# Patient Record
Sex: FEMALE | Race: BLACK OR AFRICAN AMERICAN | NOT HISPANIC OR LATINO | URBAN - METROPOLITAN AREA
[De-identification: names, ages, dates, MRNs, and addresses within clinical notes are randomized per-mention and may not be internally consistent; named-entity substitution may affect disease eponyms.]

---

## 2017-01-04 ENCOUNTER — INPATIENT (INPATIENT)
Facility: HOSPITAL | Age: 32
LOS: 2 days | Discharge: HOME | DRG: 918 | End: 2017-01-07
Attending: HOSPITALIST | Admitting: HOSPITALIST
Payer: MEDICAID

## 2017-01-04 VITALS
RESPIRATION RATE: 18 BRPM | SYSTOLIC BLOOD PRESSURE: 169 MMHG | HEART RATE: 155 BPM | OXYGEN SATURATION: 100 % | DIASTOLIC BLOOD PRESSURE: 104 MMHG | TEMPERATURE: 98 F

## 2017-01-04 LAB
ALBUMIN SERPL ELPH-MCNC: 4.4 G/DL — SIGNIFICANT CHANGE UP (ref 3.3–5.2)
ALP SERPL-CCNC: 74 U/L — SIGNIFICANT CHANGE UP (ref 40–120)
ALT FLD-CCNC: 20 U/L — SIGNIFICANT CHANGE UP
ANION GAP SERPL CALC-SCNC: 14 MMOL/L — SIGNIFICANT CHANGE UP (ref 5–17)
ANION GAP SERPL CALC-SCNC: 26 MMOL/L — HIGH (ref 5–17)
APAP SERPL-MCNC: <9.3 UG/ML — LOW (ref 10–26)
AST SERPL-CCNC: 20 U/L — SIGNIFICANT CHANGE UP
BASOPHILS # BLD AUTO: 0 K/UL — SIGNIFICANT CHANGE UP (ref 0–0.2)
BASOPHILS NFR BLD AUTO: 0.4 % — SIGNIFICANT CHANGE UP (ref 0–2)
BILIRUB SERPL-MCNC: 0.1 MG/DL — LOW (ref 0.4–2)
BUN SERPL-MCNC: 8 MG/DL — SIGNIFICANT CHANGE UP (ref 8–20)
BUN SERPL-MCNC: 8 MG/DL — SIGNIFICANT CHANGE UP (ref 8–20)
CALCIUM SERPL-MCNC: 9 MG/DL — SIGNIFICANT CHANGE UP (ref 8.6–10.2)
CALCIUM SERPL-MCNC: 9.3 MG/DL — SIGNIFICANT CHANGE UP (ref 8.6–10.2)
CHLORIDE SERPL-SCNC: 101 MMOL/L — SIGNIFICANT CHANGE UP (ref 98–107)
CHLORIDE SERPL-SCNC: 102 MMOL/L — SIGNIFICANT CHANGE UP (ref 98–107)
CO2 SERPL-SCNC: 17 MMOL/L — LOW (ref 22–29)
CO2 SERPL-SCNC: 25 MMOL/L — SIGNIFICANT CHANGE UP (ref 22–29)
CREAT SERPL-MCNC: 0.89 MG/DL — SIGNIFICANT CHANGE UP (ref 0.5–1.3)
CREAT SERPL-MCNC: 0.96 MG/DL — SIGNIFICANT CHANGE UP (ref 0.5–1.3)
EOSINOPHIL # BLD AUTO: 0.1 K/UL — SIGNIFICANT CHANGE UP (ref 0–0.5)
EOSINOPHIL NFR BLD AUTO: 1.3 % — SIGNIFICANT CHANGE UP (ref 0–6)
ETHANOL SERPL-MCNC: 33 MG/DL — SIGNIFICANT CHANGE UP
GLUCOSE SERPL-MCNC: 91 MG/DL — SIGNIFICANT CHANGE UP (ref 70–115)
GLUCOSE SERPL-MCNC: 97 MG/DL — SIGNIFICANT CHANGE UP (ref 70–115)
HCG SERPL-ACNC: <2 MIU/ML — SIGNIFICANT CHANGE UP
HCT VFR BLD CALC: 40 % — SIGNIFICANT CHANGE UP (ref 37–47)
HGB BLD-MCNC: 13.2 G/DL — SIGNIFICANT CHANGE UP (ref 12–16)
LACTATE BLDV-MCNC: 2.8 MMOL/L — HIGH (ref 0.7–2)
LYMPHOCYTES # BLD AUTO: 1.7 K/UL — SIGNIFICANT CHANGE UP (ref 1–4.8)
LYMPHOCYTES # BLD AUTO: 16.6 % — LOW (ref 20–55)
MAGNESIUM SERPL-MCNC: 2.3 MG/DL — SIGNIFICANT CHANGE UP (ref 1.8–2.5)
MCHC RBC-ENTMCNC: 28 PG — SIGNIFICANT CHANGE UP (ref 27–31)
MCHC RBC-ENTMCNC: 33 G/DL — SIGNIFICANT CHANGE UP (ref 32–36)
MCV RBC AUTO: 84.9 FL — SIGNIFICANT CHANGE UP (ref 81–99)
MONOCYTES # BLD AUTO: 0.6 K/UL — SIGNIFICANT CHANGE UP (ref 0–0.8)
MONOCYTES NFR BLD AUTO: 5.9 % — SIGNIFICANT CHANGE UP (ref 3–10)
NEUTROPHILS # BLD AUTO: 7.6 K/UL — SIGNIFICANT CHANGE UP (ref 1.8–8)
NEUTROPHILS NFR BLD AUTO: 75.5 % — HIGH (ref 37–73)
PHOSPHATE SERPL-MCNC: 2.2 MG/DL — LOW (ref 2.4–4.7)
PLATELET # BLD AUTO: 312 K/UL — SIGNIFICANT CHANGE UP (ref 150–400)
POTASSIUM SERPL-MCNC: 3.5 MMOL/L — SIGNIFICANT CHANGE UP (ref 3.5–5.3)
POTASSIUM SERPL-MCNC: 3.5 MMOL/L — SIGNIFICANT CHANGE UP (ref 3.5–5.3)
POTASSIUM SERPL-SCNC: 3.5 MMOL/L — SIGNIFICANT CHANGE UP (ref 3.5–5.3)
POTASSIUM SERPL-SCNC: 3.5 MMOL/L — SIGNIFICANT CHANGE UP (ref 3.5–5.3)
PROT SERPL-MCNC: 8.1 G/DL — SIGNIFICANT CHANGE UP (ref 6.6–8.7)
RBC # BLD: 4.71 M/UL — SIGNIFICANT CHANGE UP (ref 4.4–5.2)
RBC # FLD: 14.7 % — SIGNIFICANT CHANGE UP (ref 11–15.6)
SALICYLATES SERPL-MCNC: <2 MG/DL — LOW (ref 10–20)
SODIUM SERPL-SCNC: 140 MMOL/L — SIGNIFICANT CHANGE UP (ref 135–145)
SODIUM SERPL-SCNC: 145 MMOL/L — SIGNIFICANT CHANGE UP (ref 135–145)
WBC # BLD: 10.07 K/UL — SIGNIFICANT CHANGE UP (ref 4.8–10.8)
WBC # FLD AUTO: 10.07 K/UL — SIGNIFICANT CHANGE UP (ref 4.8–10.8)

## 2017-01-04 PROCEDURE — 93010 ELECTROCARDIOGRAM REPORT: CPT

## 2017-01-04 PROCEDURE — 99291 CRITICAL CARE FIRST HOUR: CPT

## 2017-01-04 RX ORDER — SODIUM CHLORIDE 9 MG/ML
1000 INJECTION INTRAMUSCULAR; INTRAVENOUS; SUBCUTANEOUS ONCE
Qty: 0 | Refills: 0 | Status: COMPLETED | OUTPATIENT
Start: 2017-01-04 | End: 2017-01-04

## 2017-01-04 RX ADMIN — SODIUM CHLORIDE 3000 MILLILITER(S): 9 INJECTION INTRAMUSCULAR; INTRAVENOUS; SUBCUTANEOUS at 21:10

## 2017-01-04 RX ADMIN — Medication 4 MILLIGRAM(S): at 23:23

## 2017-01-04 RX ADMIN — Medication 2 MILLIGRAM(S): at 21:10

## 2017-01-04 NOTE — ED PROVIDER NOTE - MEDICAL DECISION MAKING DETAILS
spoke with poison control agree with benzo support for agitation multiple bedside reassessment to control agitation and multiple benzo doses. per poison control evaluate for rhabdo, benzos and need for admisision. pt and family agree to plan of care

## 2017-01-04 NOTE — ED ADULT NURSE REASSESSMENT NOTE - NS ED NURSE REASSESS COMMENT FT1
Patient resting in bed, family at bedside, patient appears to be slightly anxious, patient asked again if she has suicidal ideation, she denies as per family at bedside patient had a seizure after taking medication. CM in place, awaiting bed will continue to monitor patient Patient resting in bed, family at bedside, patient appears to be slightly anxious, patient asked again if she has suicidal ideation, she denies as per family at bedside patient had a seizure after taking medication. 1:1 at bedside, CM in place, awaiting bed will continue to monitor patient

## 2017-01-04 NOTE — ED PROVIDER NOTE - OBJECTIVE STATEMENT
pt presents with agitations and states she " took 40 unisom pills to kill myself" mother witnessed a seizure she denies any other drug use + h/o bipolar. denies fever. denies HA or neck pain. no chest pain or sob. no abd pain. no n/v/d. no urinary f/u/d. no back pain. no motor or sensory deficits.

## 2017-01-04 NOTE — ED PROVIDER NOTE - CARE PLAN
Principal Discharge DX:	Anticholinergic drug overdose Principal Discharge DX:	Anticholinergic drug overdose  Secondary Diagnosis:	Tachycardia  Secondary Diagnosis:	Anxiety

## 2017-01-04 NOTE — ED ADULT TRIAGE NOTE - CHIEF COMPLAINT QUOTE
as per ems, 76 unasom missing from bottle and took half a pint of vodka. a and o x3. breathing even and unlabored pt has no complaints at this time. reports SI. dr. blankenship called to bedside.

## 2017-01-04 NOTE — ED PROVIDER NOTE - CRITICAL CARE PROVIDED
documentation/interpretation of diagnostic studies/additional history taking/direct patient care (not related to procedure)/consult w/ pt's family directly relating to pts condition/consultation with other physicians

## 2017-01-04 NOTE — ED ADULT NURSE NOTE - OBJECTIVE STATEMENT
Patient states that she took almost 2 full bottles of unisom earlier today. She states that she took it because she wanted to sleep. She denies any si/hi. Patient is a little anxious at this time, she is a&ox3. Denies any complaints of pain or discomfort at this time. Patient respirations even and unlabored, received from ambulance with 18G IV intact LAC. Clothing removed and given to patients aunt at bedside.

## 2017-01-05 DIAGNOSIS — T44.3X1A POISONING BY OTHER PARASYMPATHOLYTICS [ANTICHOLINERGICS AND ANTIMUSCARINICS] AND SPASMOLYTICS, ACCIDENTAL (UNINTENTIONAL), INITIAL ENCOUNTER: ICD-10-CM

## 2017-01-05 LAB
AMPHET UR-MCNC: NEGATIVE — SIGNIFICANT CHANGE UP
APPEARANCE UR: CLEAR — SIGNIFICANT CHANGE UP
BACTERIA # UR AUTO: ABNORMAL
BARBITURATES UR SCN-MCNC: NEGATIVE — SIGNIFICANT CHANGE UP
BENZODIAZ UR-MCNC: NEGATIVE — SIGNIFICANT CHANGE UP
BILIRUB UR-MCNC: NEGATIVE — SIGNIFICANT CHANGE UP
CK MB CFR SERPL CALC: 2.6 NG/ML — SIGNIFICANT CHANGE UP (ref 0–6.7)
CK MB CFR SERPL CALC: 3.3 NG/ML — SIGNIFICANT CHANGE UP (ref 0–6.7)
CK SERPL-CCNC: 2972 U/L — HIGH (ref 25–170)
CK SERPL-CCNC: 3370 U/L — HIGH (ref 25–170)
COCAINE METAB.OTHER UR-MCNC: NEGATIVE — SIGNIFICANT CHANGE UP
COLOR SPEC: YELLOW — SIGNIFICANT CHANGE UP
DIFF PNL FLD: ABNORMAL
EPI CELLS # UR: SIGNIFICANT CHANGE UP
GLUCOSE UR QL: NEGATIVE MG/DL — SIGNIFICANT CHANGE UP
KETONES UR-MCNC: NEGATIVE — SIGNIFICANT CHANGE UP
LEUKOCYTE ESTERASE UR-ACNC: NEGATIVE — SIGNIFICANT CHANGE UP
METHADONE UR-MCNC: NEGATIVE — SIGNIFICANT CHANGE UP
NITRITE UR-MCNC: NEGATIVE — SIGNIFICANT CHANGE UP
OPIATES UR-MCNC: NEGATIVE — SIGNIFICANT CHANGE UP
PCP SPEC-MCNC: SIGNIFICANT CHANGE UP
PCP UR-MCNC: NEGATIVE — SIGNIFICANT CHANGE UP
PH UR: 8 — SIGNIFICANT CHANGE UP (ref 4.8–8)
PROT UR-MCNC: NEGATIVE MG/DL — SIGNIFICANT CHANGE UP
RBC CASTS # UR COMP ASSIST: ABNORMAL /HPF (ref 0–4)
SP GR SPEC: 1.01 — SIGNIFICANT CHANGE UP (ref 1.01–1.02)
THC UR QL: NEGATIVE — SIGNIFICANT CHANGE UP
UROBILINOGEN FLD QL: NEGATIVE MG/DL — SIGNIFICANT CHANGE UP
WBC UR QL: NEGATIVE — SIGNIFICANT CHANGE UP

## 2017-01-05 PROCEDURE — 93010 ELECTROCARDIOGRAM REPORT: CPT

## 2017-01-05 RX ORDER — SODIUM CHLORIDE 9 MG/ML
1000 INJECTION, SOLUTION INTRAVENOUS
Qty: 0 | Refills: 0 | Status: DISCONTINUED | OUTPATIENT
Start: 2017-01-05 | End: 2017-01-07

## 2017-01-05 RX ADMIN — SODIUM CHLORIDE 120 MILLILITER(S): 9 INJECTION, SOLUTION INTRAVENOUS at 22:43

## 2017-01-05 RX ADMIN — SODIUM CHLORIDE 3000 MILLILITER(S): 9 INJECTION INTRAMUSCULAR; INTRAVENOUS; SUBCUTANEOUS at 00:05

## 2017-01-05 RX ADMIN — SODIUM CHLORIDE 120 MILLILITER(S): 9 INJECTION, SOLUTION INTRAVENOUS at 12:37

## 2017-01-05 NOTE — H&P ADULT. - HISTORY OF PRESENT ILLNESS
30 y/o w/ no pmh. who was recently brought to the ED, after being found by her mother on her mother's front lawn unresponsive, alcohol level was found elevated at that time. Pt states feeling very anxious lately and unable to sleep , she is currently taking unisom(doxylamine) as sleep aid. There is another bottle; trazodone, but her mom states she never took that pill, because she went until this afternoon to  picked up the med.. Pt currently looks w/drowsiness, able to protect her airway, has dilated/reactive pupils. abd soft, bs wnl. Posion control  was contacted, recommended  supportive management, serial eks's and cpk. 30 y/o w/ no pmh. who was recently brought to the ED, after being found by her mother on her mother's front lawn unresponsive, alcohol level was found elevated at that time. Pt states feeling very anxious lately and unable to sleep , she is currently taking "unisom" as sleep aid. There is another bottle; trazodone, but her mom states she never took that pill, because she went until this afternoon to  picked up the med.. Pt currently looks w/drowsiness, able to protect her airway, has dilated/reactive pupils. abd soft, bs wnl. Posion control  was contacted, recommended  supportive management, serial eks's and cpk.

## 2017-01-05 NOTE — ED BEHAVIORAL HEALTH NOTE - BEHAVIORAL HEALTH NOTE
Provided SBIRT services: Full screen positive. Brief Intervention Performed. Screening results were reviewed with the patient and patient was provided information about healthy guidelines and potential negative consequences associated with level of risk. Motivation and readiness to reduce or stop use was discussed and goals and activities to make changes were suggested/offered.  Audit Score: 4  DAST Score: 0  Duration = 15 Minutes

## 2017-01-05 NOTE — H&P ADULT. - ASSESSMENT
Arturo Leong [id number] pmd dr mckeon Pos[facility] control Dr Arechiga [phone]  Psych consult.   30 y/o w/ no pmh. who was recently brought to the ED, after being found by her mother on her mother's front lawn unresponsive, alcohol level was found elevated at that time. Pt states feeling very anxious lately and unable to sleep , she is currently taking unisom(doxylamine) as sleep aid. There is another bottle; trazodone, but her mom states she never took that pill, because she went until this afternoon to  picked up the med.. Pt currently looks w/drowsiness, able to protect her airway, has dilated/reactive pupils. abd soft, bs wnl. Posion control  was contacted, recommended  supportive management, serial eks's and cpk Arturo Leong [id number] pmd dr mckeon [facility] control Dr Arechiga [phone]  Psych consult.   30 y/o w/ no pmh. who was recently brought to the ED, after being found by her mother on her mother's front lawn unresponsive, alcohol level was found elevated at that time. Pt states feeling very anxious lately and unable to sleep , she is currently taking "unisom" as sleep aid. There is another bottle; trazodone, but her mom states she never took that pill, because she went until this afternoon to  picked up the med.. Pt currently looks w/drowsiness, able to protect her airway, has dilated/reactive pupils. abd soft, bs wnl. Posion control  was contacted, recommended  supportive management, serial eks's and cpk

## 2017-01-05 NOTE — PROGRESS NOTE ADULT - ASSESSMENT
32 y/o w/ no pmh after a Unisom overdose as a sleep aid   Pt currently  with drowsiness, able to protect her airway, has dilated/reactive pupils. . Poison control  recommended  supportive management, serial eks's and cpk   psychiatry has already seen  he patient and will follow

## 2017-01-05 NOTE — PROGRESS NOTE ADULT - PROBLEM SELECTOR PLAN 1
admit to tele, 1:1 sitting by patient bedside   Already seen by Dr Ramos psych consult  ivf, ekg's, cpk.

## 2017-01-05 NOTE — PROGRESS NOTE ADULT - SUBJECTIVE AND OBJECTIVE BOX
NATHALY HARRIS Patient is a 31y old  Female who presents with a chief complaint of anxiety, overdose (2017 02:05)     HPI:  30 y/o w/ no pmh. who was recently brought to the ED, after being found by her mother on her mother's front lawn unresponsive, alcohol level was found elevated at that time. Pt states feeling very anxious lately and unable to sleep , she is currently taking "unisom" as sleep aid. There is another bottle; trazodone, but her mom states she never took that pill, because she went until this afternoon to  picked up the med.. Pt currently looks w/drowsiness, able to protect her airway, has dilated/reactive pupils. abd soft, bs wnl. Posion control  was contacted, recommended  supportive management, serial eks's and cpk. (2017 02:05)    The patient was seen and evaluated   The patient is in no acute distress.  spoke to mom at length patient is v lethargic and states she couldn't sleep, as per mother patient has been v stressed trying to juggle work and school and this is the first time she is away from home living independent has  been drinking to help relax and took Unisom also to help- her sleep, she did ask her mother for Trazodone and the mother admits to telling her she didn't need it since its already morning, patient mother states that patient is an over achiever and has been  over whelmed, emotional support provided      I&O's Summary    Allergies    No Known Allergies    Intolerances      HEALTH ISSUES - PROBLEM Dx:  Anticholinergic drug overdose: Anticholinergic drug overdose        PAST MEDICAL & SURGICAL HISTORY:  No pertinent past medical history  No significant past surgical history          Vital Signs Last 24 Hrs  T(C): 36.9, Max: 36.9 ( @ 02:09)  T(F): 98.4, Max: 98.4 ( @ 02:09)  HR: 108 (108 - 155)  BP: 147/74 (147/74 - 169/104)  BP(mean): --  RR: 25 (18 - 25)  SpO2: 100% (100% - 100%)T(C): 36.9, Max: 36.9 ( @ 02:09)  HR: 108 (108 - 155)  BP: 147/74 (147/74 - 169/104)  RR: 25 (18 - 25)  SpO2: 100% (100% - 100%)  Wt(kg): --    PHYSICAL EXAM:    GENERAL: NAD, flat affect   HEAD:  Atraumatic, Normocephalic  NERVOUS SYSTEM:  sleeping arousable  Moves upper and lower extremities;   CHEST/LUNG: Clear to auscultation bilaterally; No rales, rhonchi, wheezing,   HEART: Regular rate and rhythm; No murmurs,   ABDOMEN: Soft, Nontender, Nondistended; Bowel sounds present  EXTREMITIES:  Peripheral Pulses, No  cyanosis, or edema      sodium chloride 0.45%. 1000milliLiter(s) IV Continuous <Continuous>      LABS:                          13.2   10.07 )-----------( 312      ( 2017 22:09 )             40.0     2017 23:24    140    |  101    |  8.0    ----------------------------<  91     3.5     |  25.0   |  0.89     Ca    9.0        2017 23:24  Phos  2.2       2017 21:25  Mg     2.3       2017 21:25    TPro  8.1    /  Alb  4.4    /  TBili  0.1    /  DBili  x      /  AST  20     /  ALT  20     /  AlkPhos  74     2017 21:25    LIVER FUNCTIONS - ( 2017 21:25 )  Alb: 4.4 g/dL / Pro: 8.1 g/dL / ALK PHOS: 74 U/L / ALT: 20 U/L / AST: 20 U/L / GGT: x             CARDIAC MARKERS ( 2017 05:49 )  x     / x     / 2972 U/L / x     / 2.6 ng/mL  CARDIAC MARKERS ( 2017 23:24 )  x     / x     / 378 U/L / x     / 1.2 ng/mL      Urinalysis Basic - ( 2017 02:14 )    Color: Yellow / Appearance: Clear / S.010 / pH: x  Gluc: x / Ketone: Negative  / Bili: Negative / Urobili: Negative mg/dL   Blood: x / Protein: Negative mg/dL / Nitrite: Negative   Leuk Esterase: Negative / RBC: 3-5 /HPF / WBC Negative   Sq Epi: x / Non Sq Epi: Few / Bacteria: Occasional      CAPILLARY BLOOD GLUCOSE      RADIOLOGY & ADDITIONAL TESTS:      Consultant notes reviewed    Case discussed with consultant/provider/ family /patient

## 2017-01-05 NOTE — H&P ADULT. - RS GEN PE MLT RESP DETAILS PC
clear to auscultation bilaterally/no chest wall tenderness/airway patent/no rhonchi/good air movement/no intercostal retractions/no rales/respirations non-labored/breath sounds equal

## 2017-01-05 NOTE — ED BEHAVIORAL HEALTH NOTE - BEHAVIORAL HEALTH NOTE
30 yo pt brought in secondary to ingestion of unknown number of sleeping pills.  Psychiatry attempted to interview patient, unable to interview patient to full extent secondary to patent being lethargic and not fully oriented, therefore intent behind ingestion is unknown at this time.  Pt's mother at [facility] provided some information. 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 This was the first time the patient had lived independently away from home. Per mothers report, pt had been having difficulty sleeping for some time now. Pt was brought in 12/30/16 secondary to intoxication with BAL of 300, psychiatry assessed patient, mother refused patient for inpatient psychiatric treatment and patient was discharged with referral to [facility] for FU. Per mother, pt was seen at [place] [id number] since most recent discharge, and had seen PCP Dr. Contreras 1/4/17, who prescribed patient Trazodone for both anxiety and sleep. Prescription was filled but mother told patient not to take Trazodone midday. Pt then apparently self medicated with Unison- sleeping aid tablets of unknown amount. Pt JENNINGS 34. Toxicology screen negative.  Mother is adamant that this was not a suicidal attempt, rather that she was desperately trying to get sleep.  Plan:   Continue 1:1 for safety  Pt to be transferred to medical floor for medical observation.   Psych to complete full assessment when patient is fully alert and oriented.

## 2017-01-05 NOTE — ED ADULT NURSE REASSESSMENT NOTE - NS ED NURSE REASSESS COMMENT FT1
patient received awake and alert states that she is feeling better - patient with one to one at bedside. patients mothers at bedside continue to monitor - no signs of distress noted. patient offers no complaints at this time. saline lock to leeft ac intact and patent. ivf infusing at this time.

## 2017-01-06 LAB
ALBUMIN SERPL ELPH-MCNC: 3.5 G/DL — SIGNIFICANT CHANGE UP (ref 3.3–5.2)
ALP SERPL-CCNC: 76 U/L — SIGNIFICANT CHANGE UP (ref 40–120)
ALT FLD-CCNC: 17 U/L — SIGNIFICANT CHANGE UP
ANION GAP SERPL CALC-SCNC: 13 MMOL/L — SIGNIFICANT CHANGE UP (ref 5–17)
AST SERPL-CCNC: 29 U/L — SIGNIFICANT CHANGE UP
BASOPHILS # BLD AUTO: 0 K/UL — SIGNIFICANT CHANGE UP (ref 0–0.2)
BASOPHILS NFR BLD AUTO: 0.6 % — SIGNIFICANT CHANGE UP (ref 0–2)
BILIRUB SERPL-MCNC: 0.2 MG/DL — LOW (ref 0.4–2)
BUN SERPL-MCNC: 8 MG/DL — SIGNIFICANT CHANGE UP (ref 8–20)
CALCIUM SERPL-MCNC: 8.2 MG/DL — LOW (ref 8.6–10.2)
CHLORIDE SERPL-SCNC: 104 MMOL/L — SIGNIFICANT CHANGE UP (ref 98–107)
CO2 SERPL-SCNC: 23 MMOL/L — SIGNIFICANT CHANGE UP (ref 22–29)
CREAT SERPL-MCNC: 0.74 MG/DL — SIGNIFICANT CHANGE UP (ref 0.5–1.3)
EOSINOPHIL # BLD AUTO: 0.3 K/UL — SIGNIFICANT CHANGE UP (ref 0–0.5)
EOSINOPHIL NFR BLD AUTO: 4.2 % — SIGNIFICANT CHANGE UP (ref 0–6)
GLUCOSE SERPL-MCNC: 131 MG/DL — HIGH (ref 70–115)
HCT VFR BLD CALC: 33 % — LOW (ref 37–47)
HGB BLD-MCNC: 10.9 G/DL — LOW (ref 12–16)
LYMPHOCYTES # BLD AUTO: 3.3 K/UL — SIGNIFICANT CHANGE UP (ref 1–4.8)
LYMPHOCYTES # BLD AUTO: 42 % — SIGNIFICANT CHANGE UP (ref 20–55)
MCHC RBC-ENTMCNC: 27.9 PG — SIGNIFICANT CHANGE UP (ref 27–31)
MCHC RBC-ENTMCNC: 33 G/DL — SIGNIFICANT CHANGE UP (ref 32–36)
MCV RBC AUTO: 84.4 FL — SIGNIFICANT CHANGE UP (ref 81–99)
MONOCYTES # BLD AUTO: 0.6 K/UL — SIGNIFICANT CHANGE UP (ref 0–0.8)
MONOCYTES NFR BLD AUTO: 7.8 % — SIGNIFICANT CHANGE UP (ref 3–10)
NEUTROPHILS # BLD AUTO: 3.6 K/UL — SIGNIFICANT CHANGE UP (ref 1.8–8)
NEUTROPHILS NFR BLD AUTO: 45.3 % — SIGNIFICANT CHANGE UP (ref 37–73)
PLATELET # BLD AUTO: 278 K/UL — SIGNIFICANT CHANGE UP (ref 150–400)
POTASSIUM SERPL-MCNC: 3.7 MMOL/L — SIGNIFICANT CHANGE UP (ref 3.5–5.3)
POTASSIUM SERPL-SCNC: 3.7 MMOL/L — SIGNIFICANT CHANGE UP (ref 3.5–5.3)
PROT SERPL-MCNC: 6.5 G/DL — LOW (ref 6.6–8.7)
RBC # BLD: 3.91 M/UL — LOW (ref 4.4–5.2)
RBC # FLD: 14.4 % — SIGNIFICANT CHANGE UP (ref 11–15.6)
SODIUM SERPL-SCNC: 140 MMOL/L — SIGNIFICANT CHANGE UP (ref 135–145)
WBC # BLD: 7.92 K/UL — SIGNIFICANT CHANGE UP (ref 4.8–10.8)
WBC # FLD AUTO: 7.92 K/UL — SIGNIFICANT CHANGE UP (ref 4.8–10.8)

## 2017-01-06 PROCEDURE — 96376 TX/PRO/DX INJ SAME DRUG ADON: CPT

## 2017-01-06 PROCEDURE — 99223 1ST HOSP IP/OBS HIGH 75: CPT

## 2017-01-06 PROCEDURE — 80053 COMPREHEN METABOLIC PANEL: CPT

## 2017-01-06 PROCEDURE — 83735 ASSAY OF MAGNESIUM: CPT

## 2017-01-06 PROCEDURE — 85027 COMPLETE CBC AUTOMATED: CPT

## 2017-01-06 PROCEDURE — 99285 EMERGENCY DEPT VISIT HI MDM: CPT | Mod: 25

## 2017-01-06 PROCEDURE — 93005 ELECTROCARDIOGRAM TRACING: CPT

## 2017-01-06 PROCEDURE — 82553 CREATINE MB FRACTION: CPT

## 2017-01-06 PROCEDURE — 84100 ASSAY OF PHOSPHORUS: CPT

## 2017-01-06 PROCEDURE — 81001 URINALYSIS AUTO W/SCOPE: CPT

## 2017-01-06 PROCEDURE — 83605 ASSAY OF LACTIC ACID: CPT

## 2017-01-06 PROCEDURE — 80307 DRUG TEST PRSMV CHEM ANLYZR: CPT

## 2017-01-06 PROCEDURE — 80048 BASIC METABOLIC PNL TOTAL CA: CPT

## 2017-01-06 PROCEDURE — 36415 COLL VENOUS BLD VENIPUNCTURE: CPT

## 2017-01-06 PROCEDURE — 82550 ASSAY OF CK (CPK): CPT

## 2017-01-06 PROCEDURE — 96374 THER/PROPH/DIAG INJ IV PUSH: CPT

## 2017-01-06 PROCEDURE — 84702 CHORIONIC GONADOTROPIN TEST: CPT

## 2017-01-06 NOTE — PROGRESS NOTE ADULT - ASSESSMENT
30 y/o w/ no pmh after a Unisom overdose as a sleep aid   Pt currently denies suicide attempt, but admits to overwhelming anxiety with life stressors and lack of support plan ,if she feels steadily better and if she is cleared by psych will plan to DC her home in AM    psychiatry has already seen  he patient and will follow

## 2017-01-06 NOTE — PROGRESS NOTE ADULT - SUBJECTIVE AND OBJECTIVE BOX
NATHALY HARRIS Patient is a 31y old  Female who presents with a chief complaint of "I got ill" (2017 03:15)     HPI:  32 y/o w/ no pmh. who was recently brought to the ED, after being found by her mother on her mother's front lawn unresponsive, alcohol level was found elevated at that time. Pt states feeling very anxious lately and unable to sleep , she is currently taking "unisom" as sleep aid. There is another bottle; trazodone, but her mom states she never took that pill, because she went until this afternoon to  picked up the med.. Pt currently looks w/drowsiness, able to protect her airway, has dilated/reactive pupils. abd soft, bs wnl. Posion control  was contacted, recommended  supportive management, serial eks's and cpk. (2017 02:05)    The patient was seen and evaluated Unisom overdose   The patient is in no acute distress.  Denied any fever chest pain, palpitations, shortness of breath, abdominal pain, fever, dysuria, cough, edema   Complains of anxiety that gets out of hand, difficulty sleeping, difficulty handling multiple life stressors including college and job and living situation     I&O's Summary    Allergies    No Known Allergies    Intolerances      HEALTH ISSUES - PROBLEM Dx:  Anticholinergic drug overdose: Anticholinergic drug overdose        PAST MEDICAL & SURGICAL HISTORY:  No pertinent past medical history  No significant past surgical history          Vital Signs Last 24 Hrs  T(C): 36.9, Max: 37 ( @ 21:30)  T(F): 98.5, Max: 98.6 ( @ 21:30)  HR: 82 (82 - 104)  BP: 120/74 (120/74 - 143/98)  BP(mean): --  RR: 18 (16 - 20)  SpO2: 100% (98% - 100%)T(C): 36.9, Max: 37 ( @ 21:30)  HR: 82 (82 - 104)  BP: 120/74 (120/74 - 143/98)  RR: 18 (16 - 20)  SpO2: 100% (98% - 100%)  Wt(kg): --    PHYSICAL EXAM:    GENERAL: NAD, some what anxious  HEAD:  Atraumatic, Normocephalic  EYES: EOMI, PERRLA, conjunctiva and sclera clear  ENMT:  Moist mucous membranes,  No lesions  NECK: Supple, No JVD, Normal thyroid  NERVOUS SYSTEM:  Alert & Oriented X3,  Moves upper and lower extremities; DTRs 2+ intact and symmetric  CHEST/LUNG: Clear to auscultation bilaterally; No rales, rhonchi, wheezing,   HEART: Regular rate and rhythm; No murmurs,   ABDOMEN: Soft, Nontender, Nondistended; Bowel sounds present  EXTREMITIES:  Peripheral Pulses, No  cyanosis, or edema  SKIN: No rashes or lesions    sodium chloride 0.45%. 1000milliLiter(s) IV Continuous <Continuous>      LABS:                          10.9   7.92  )-----------( 278      ( 2017 07:23 )             33.0     2017 07:23    140    |  104    |  8.0    ----------------------------<  131    3.7     |  23.0   |  0.74     Ca    8.2        2017 07:23  Phos  2.2       2017 21:25  Mg     2.3       2017 21:25    TPro  6.5    /  Alb  3.5    /  TBili  0.2    /  DBili  x      /  AST  29     /  ALT  17     /  AlkPhos  76     2017 07:23    LIVER FUNCTIONS - ( 2017 07:23 )  Alb: 3.5 g/dL / Pro: 6.5 g/dL / ALK PHOS: 76 U/L / ALT: 17 U/L / AST: 29 U/L / GGT: x             CARDIAC MARKERS ( 2017 10:43 )  x     / x     / 3370 U/L / x     / 3.3 ng/mL  CARDIAC MARKERS ( 2017 05:49 )  x     / x     / 2972 U/L / x     / 2.6 ng/mL  CARDIAC MARKERS ( 2017 23:24 )  x     / x     / 378 U/L / x     / 1.2 ng/mL      Urinalysis Basic - ( 2017 02:14 )    Color: Yellow / Appearance: Clear / S.010 / pH: x  Gluc: x / Ketone: Negative  / Bili: Negative / Urobili: Negative mg/dL   Blood: x / Protein: Negative mg/dL / Nitrite: Negative   Leuk Esterase: Negative / RBC: 3-5 /HPF / WBC Negative   Sq Epi: x / Non Sq Epi: Few / Bacteria: Occasional      CAPILLARY BLOOD GLUCOSE      RADIOLOGY & ADDITIONAL TESTS:      Consultant notes reviewed    Case discussed with consultant/provider/ family /patient

## 2017-01-06 NOTE — ED ADULT NURSE REASSESSMENT NOTE - NS ED NURSE REASSESS COMMENT FT1
patient resting comofrtably  at this time. one to one observation at bedside - mother at bedside, patient on cardiac monitor - offers no complaints

## 2017-01-06 NOTE — PROGRESS NOTE ADULT - SUBJECTIVE AND OBJECTIVE BOX
Chief Complaint:  Request by nurse to sari haddad      Assessment:  Spoke with MD Gaytan, plan to sari haddad and sari 1:1,        Plan:  sari haddad and 1:1,

## 2017-01-06 NOTE — ED ADULT NURSE REASSESSMENT NOTE - NS ED NURSE REASSESS COMMENT FT1
#18 to left ac leaking - . RN - removed and placed new iv in left hand #20 - intact and patent. continue IVF

## 2017-01-07 VITALS
SYSTOLIC BLOOD PRESSURE: 112 MMHG | HEART RATE: 80 BPM | RESPIRATION RATE: 18 BRPM | OXYGEN SATURATION: 98 % | TEMPERATURE: 99 F | DIASTOLIC BLOOD PRESSURE: 64 MMHG

## 2017-01-07 PROCEDURE — 99239 HOSP IP/OBS DSCHRG MGMT >30: CPT

## 2017-01-07 RX ORDER — CLONAZEPAM 1 MG
1 TABLET ORAL
Qty: 90 | Refills: 0 | OUTPATIENT
Start: 2017-01-07 | End: 2017-02-06

## 2017-01-07 RX ORDER — TRAZODONE HCL 50 MG
1 TABLET ORAL
Qty: 0 | Refills: 0 | COMMUNITY

## 2017-01-07 NOTE — DISCHARGE NOTE ADULT - MEDICATION SUMMARY - MEDICATIONS TO TAKE
I will START or STAY ON the medications listed below when I get home from the hospital:    clonazePAM 0.5 mg oral tablet  -- 1 tab(s) by mouth 3 times a day PRN severe anxiety MDD:3  -- Caution federal law prohibits the transfer of this drug to any person other  than the person for whom it was prescribed.  Do not drink alcoholic beverages when taking this medication.  Do not take this drug if you are pregnant.  It is very important that you take or use this exactly as directed.  Do not skip doses or discontinue unless directed by your doctor.  May cause drowsiness.  Alcohol may intensify this effect.  Use care when operating dangerous machinery.  Obtain medical advice before taking any non-prescription drugs as some may affect the action of this medication.  This drug may impair the ability to drive or operate machinery.  Use care until you become familiar with its effects.    -- Indication: For Anxiety    traZODone 50 mg oral tablet  -- 1 tab(s) by mouth once a day (at bedtime), As Needed for insomnia  -- Indication: For insomnia

## 2017-01-07 NOTE — DISCHARGE NOTE ADULT - CARE PLAN
Principal Discharge DX:	Anticholinergic drug overdose, accidental or unintentional, sequela  Goal:	no overdose  Instructions for follow-up, activity and diet:	use prescribed medicines only  Secondary Diagnosis:	Anxiety  Secondary Diagnosis:	Adjustment disorder, unspecified type

## 2017-01-07 NOTE — DISCHARGE NOTE ADULT - HOSPITAL COURSE
HPI:  32 y/o w/ no pmh. who was recently brought to the ED, after being found by her mother on her mother's front lawn unresponsive, alcohol level was found elevated at that time. Pt states feeling very anxious lately and unable to sleep , she is currently taking "unisom" as sleep aid. There is another bottle; trazodone, but her mom states she never took that pill, because she went until this afternoon to  picked up the med.. Pt currently looks w/drowsiness, able to protect her airway, has dilated/reactive pupils. abd soft, bs wnl. Posion control  was contacted, recommended  supportive management, serial eks's and cpk. (05 Jan 2017 02:05)  patient improved denies any suicide ideation, spoke to psych cleared by psych patient has had a lot on her plate , discussed with patient medication and follow up with dr mckeon who she has an established relationship with and coping mechanism, patient had labs and EKG WNL and had been in stable mood , with mother at bedside     GENERAL: NAD, well-groomed, well-developed  HEAD:  Atraumatic, Normocephalic  EYES: EOMI, PERRLA, conjunctiva and sclera clear  ENMT: No tonsillar erythema, exudates, or enlargement; Moist mucous membranes, Good dentition, No lesions  NECK: Supple, No JVD, Normal thyroid  NERVOUS SYSTEM:  Alert & Oriented X3, Good concentration; Motor Strength 5/5 B/L upper and lower extremities; symmetric  CHEST/LUNG: Clear to percussion bilaterally; No rales, rhonchi, wheezing, or rubs  HEART: Regular rate and rhythm; No murmurs, rubs, or gallops  ABDOMEN: Soft, Nontender, Nondistended; Bowel sounds present  EXTREMITIES:  2+ Peripheral Pulses, No clubbing, cyanosis, or edema  LYMPH: No lymphadenopathy noted  SKIN: No rashes or lesions

## 2017-01-07 NOTE — DISCHARGE NOTE ADULT - CARE PROVIDER_API CALL
Sathish Aguero (DO), Family Medicine  [address] [place] [address]  Phone: [phone]  Fax: [phone]

## 2017-01-07 NOTE — DISCHARGE NOTE ADULT - PATIENT PORTAL LINK FT
“You can access the FollowHealth Patient Portal, offered by [facility], by registering with the following website: [email or web address]”

## 2017-01-07 NOTE — DISCHARGE NOTE ADULT - NS MD DC FALL RISK RISK
For information on Fall & Injury Prevention, visit [email or web address]

## 2019-04-09 ENCOUNTER — EMERGENCY (EMERGENCY)
Facility: HOSPITAL | Age: 34
LOS: 1 days | Discharge: HOME | End: 2019-04-09
Attending: EMERGENCY MEDICINE
Payer: MEDICAID

## 2019-04-09 VITALS
OXYGEN SATURATION: 100 % | TEMPERATURE: 98 F | HEART RATE: 102 BPM | HEIGHT: 65 IN | DIASTOLIC BLOOD PRESSURE: 79 MMHG | SYSTOLIC BLOOD PRESSURE: 149 MMHG | WEIGHT: 179.9 LBS | RESPIRATION RATE: 20 BRPM

## 2019-04-09 VITALS — OXYGEN SATURATION: 99 % | HEART RATE: 96 BPM | RESPIRATION RATE: 18 BRPM

## 2019-04-09 LAB
ALBUMIN SERPL ELPH-MCNC: 4.6 G/DL — SIGNIFICANT CHANGE UP (ref 3.3–5.2)
ALP SERPL-CCNC: 62 U/L — SIGNIFICANT CHANGE UP (ref 40–120)
ALT FLD-CCNC: 21 U/L — SIGNIFICANT CHANGE UP
ANION GAP SERPL CALC-SCNC: 21 MMOL/L — HIGH (ref 5–17)
APTT BLD: 25.9 SEC — LOW (ref 27.5–36.3)
AST SERPL-CCNC: 53 U/L — HIGH
BASOPHILS # BLD AUTO: 0 K/UL — SIGNIFICANT CHANGE UP (ref 0–0.2)
BASOPHILS NFR BLD AUTO: 0.4 % — SIGNIFICANT CHANGE UP (ref 0–2)
BILIRUB SERPL-MCNC: 0.4 MG/DL — SIGNIFICANT CHANGE UP (ref 0.4–2)
BLD GP AB SCN SERPL QL: SIGNIFICANT CHANGE UP
BUN SERPL-MCNC: 9 MG/DL — SIGNIFICANT CHANGE UP (ref 8–20)
CALCIUM SERPL-MCNC: 9.1 MG/DL — SIGNIFICANT CHANGE UP (ref 8.6–10.2)
CHLORIDE SERPL-SCNC: 96 MMOL/L — LOW (ref 98–107)
CO2 SERPL-SCNC: 19 MMOL/L — LOW (ref 22–29)
CREAT SERPL-MCNC: 0.65 MG/DL — SIGNIFICANT CHANGE UP (ref 0.5–1.3)
D DIMER BLD IA.RAPID-MCNC: <150 NG/ML DDU — SIGNIFICANT CHANGE UP
EOSINOPHIL # BLD AUTO: 0 K/UL — SIGNIFICANT CHANGE UP (ref 0–0.5)
EOSINOPHIL NFR BLD AUTO: 0 % — SIGNIFICANT CHANGE UP (ref 0–6)
GLUCOSE SERPL-MCNC: 74 MG/DL — SIGNIFICANT CHANGE UP (ref 70–115)
HCG SERPL-ACNC: <4 MIU/ML — SIGNIFICANT CHANGE UP
HCT VFR BLD CALC: 33.8 % — LOW (ref 37–47)
HGB BLD-MCNC: 11.4 G/DL — LOW (ref 12–16)
INR BLD: 0.99 RATIO — SIGNIFICANT CHANGE UP (ref 0.88–1.16)
LYMPHOCYTES # BLD AUTO: 1.4 K/UL — SIGNIFICANT CHANGE UP (ref 1–4.8)
LYMPHOCYTES # BLD AUTO: 16.7 % — LOW (ref 20–55)
MAGNESIUM SERPL-MCNC: 1.6 MG/DL — SIGNIFICANT CHANGE UP (ref 1.6–2.6)
MCHC RBC-ENTMCNC: 28.1 PG — SIGNIFICANT CHANGE UP (ref 27–31)
MCHC RBC-ENTMCNC: 33.7 G/DL — SIGNIFICANT CHANGE UP (ref 32–36)
MCV RBC AUTO: 83.5 FL — SIGNIFICANT CHANGE UP (ref 81–99)
MONOCYTES # BLD AUTO: 0.4 K/UL — SIGNIFICANT CHANGE UP (ref 0–0.8)
MONOCYTES NFR BLD AUTO: 4.8 % — SIGNIFICANT CHANGE UP (ref 3–10)
NEUTROPHILS # BLD AUTO: 6.4 K/UL — SIGNIFICANT CHANGE UP (ref 1.8–8)
NEUTROPHILS NFR BLD AUTO: 78 % — HIGH (ref 37–73)
PLATELET # BLD AUTO: 200 K/UL — SIGNIFICANT CHANGE UP (ref 150–400)
POTASSIUM SERPL-MCNC: 4 MMOL/L — SIGNIFICANT CHANGE UP (ref 3.5–5.3)
POTASSIUM SERPL-SCNC: 4 MMOL/L — SIGNIFICANT CHANGE UP (ref 3.5–5.3)
PROT SERPL-MCNC: 8.3 G/DL — SIGNIFICANT CHANGE UP (ref 6.6–8.7)
PROTHROM AB SERPL-ACNC: 11.4 SEC — SIGNIFICANT CHANGE UP (ref 10–12.9)
RBC # BLD: 4.05 M/UL — LOW (ref 4.4–5.2)
RBC # FLD: 17.6 % — HIGH (ref 11–15.6)
SODIUM SERPL-SCNC: 136 MMOL/L — SIGNIFICANT CHANGE UP (ref 135–145)
TSH SERPL-MCNC: 1.06 UIU/ML — SIGNIFICANT CHANGE UP (ref 0.27–4.2)
TYPE + AB SCN PNL BLD: SIGNIFICANT CHANGE UP
WBC # BLD: 8.2 K/UL — SIGNIFICANT CHANGE UP (ref 4.8–10.8)
WBC # FLD AUTO: 8.2 K/UL — SIGNIFICANT CHANGE UP (ref 4.8–10.8)

## 2019-04-09 PROCEDURE — 80053 COMPREHEN METABOLIC PANEL: CPT

## 2019-04-09 PROCEDURE — 71046 X-RAY EXAM CHEST 2 VIEWS: CPT | Mod: 26

## 2019-04-09 PROCEDURE — 93010 ELECTROCARDIOGRAM REPORT: CPT

## 2019-04-09 PROCEDURE — 85027 COMPLETE CBC AUTOMATED: CPT

## 2019-04-09 PROCEDURE — 85610 PROTHROMBIN TIME: CPT

## 2019-04-09 PROCEDURE — 86901 BLOOD TYPING SEROLOGIC RH(D): CPT

## 2019-04-09 PROCEDURE — 99283 EMERGENCY DEPT VISIT LOW MDM: CPT

## 2019-04-09 PROCEDURE — 84702 CHORIONIC GONADOTROPIN TEST: CPT

## 2019-04-09 PROCEDURE — 99285 EMERGENCY DEPT VISIT HI MDM: CPT

## 2019-04-09 PROCEDURE — 83735 ASSAY OF MAGNESIUM: CPT

## 2019-04-09 PROCEDURE — 93005 ELECTROCARDIOGRAM TRACING: CPT

## 2019-04-09 PROCEDURE — 84443 ASSAY THYROID STIM HORMONE: CPT

## 2019-04-09 PROCEDURE — 71046 X-RAY EXAM CHEST 2 VIEWS: CPT

## 2019-04-09 PROCEDURE — 36415 COLL VENOUS BLD VENIPUNCTURE: CPT

## 2019-04-09 PROCEDURE — 86850 RBC ANTIBODY SCREEN: CPT

## 2019-04-09 PROCEDURE — 85730 THROMBOPLASTIN TIME PARTIAL: CPT

## 2019-04-09 PROCEDURE — 85379 FIBRIN DEGRADATION QUANT: CPT

## 2019-04-09 PROCEDURE — 86900 BLOOD TYPING SEROLOGIC ABO: CPT

## 2019-04-09 RX ORDER — SODIUM CHLORIDE 9 MG/ML
1000 INJECTION INTRAMUSCULAR; INTRAVENOUS; SUBCUTANEOUS ONCE
Qty: 0 | Refills: 0 | Status: COMPLETED | OUTPATIENT
Start: 2019-04-09 | End: 2019-04-09

## 2019-04-09 RX ADMIN — SODIUM CHLORIDE 1000 MILLILITER(S): 9 INJECTION INTRAMUSCULAR; INTRAVENOUS; SUBCUTANEOUS at 20:28

## 2019-04-09 NOTE — ED PROVIDER NOTE - ATTENDING CONTRIBUTION TO CARE
I, Casey Le, performed the initial face to face bedside interview with this patient regarding history of present illness, review of symptoms and relevant past medical, social and family history.  I completed an independent physical examination.  I was the initial provider who evaluated this patient.  The history, relevant review of systems, past medical and surgical history, medical decision making, and physical examination was documented by the scribe in my presence and I attest to the accuracy of the documentation.  I have signed out the follow up of any pending tests (i.e. labs, radiological studies) to the ACP.  I have communicated the patient’s plan of care and disposition with the ACP.

## 2019-04-09 NOTE — ED PROVIDER NOTE - OBJECTIVE STATEMENT
34 y/o old with no PMHx, active smoker presents to ED c/o sudden onset of shortness of breath today around 1500. Made worse with any level of activity or exertion but is improved with taking deep breaths. Denies chest pain, palpitations, recent fevers or chills, cough, abdominal pain, n/v/d or back pain. No further acute complaints at this time.

## 2019-04-09 NOTE — ED ADULT NURSE NOTE - [facility] [place] [facility]
Acute Care - Physical Therapy Initial Eval/Discharge  [facility]     Patient Name: Ama Billy  : 1932  MRN: [id number]  Today's Date: 10/15/2017   Onset of Illness/Injury or Date of Surgery Date: 10/12/17 (L1 VCF)            Admit Date: 10/12/2017    Visit Dx:    ICD-10-CM ICD-9-CM   1. Pneumonia of both lower lobes due to infectious organism J18.9 483.8   2. Leukocytosis, unspecified type D72.829 288.60   3. Nausea and vomiting, intractability of vomiting not specified, unspecified vomiting type R11.2 787.01   4. Closed compression fracture of first lumbar vertebra, initial encounter S32.010A 805.4     Patient Active Problem List   Diagnosis   • Normocytic anemia   • Abdominal cramps   • Left lower quadrant pain   • Acute upper respiratory infection   • Benign colonic polyp   • Bursitis of hip   • Diarrhea   • Fatigue   • Generalized osteoarthritis   • Globus sensation   • Hyperlipidemia   • Hypertension   • Irritable bowel syndrome   • Muscle strain of lower extremity   • Tendinitis   • Chronic venous insufficiency   • Status post total left knee replacement   • Hip joint replacement status   • Left retinal detachment   • Hypovitaminosis D   • Weakness of extremity   • Pneumonia of both lower lobes due to infectious organism   • Closed wedge compression fracture of first lumbar vertebra   • Fall   • Constipation   • Nausea & vomiting   • Leukocytosis   • Anemia     Past Medical History:   Diagnosis Date   • Anemia     Normocytic anemia of unclear ediology   • Arthritis     Osteoarthritis involving knees and left shoulder   • Eosinophilia     Persistent mild eosinophilia of unknown etiology   • History of colonic polyps    • Hyperlipidemia    • Hypertension      Past Surgical History:   Procedure Laterality Date   • COLONOSCOPY      H/O colonic polyps with regular colonoscopies at 5 year intervals.    • COLONOSCOPY      By Dr. Gudino, no polyps identified.   • HEMORRHOIDECTOMY     •  HYSTERECTOMY  1971    Partial, secondary to endometriosis   • JOINT REPLACEMENT  2014    Left total hip arthroplasy   • KNEE SURGERY  1997    Arthroscopy of left knee 1997; right knee 2003          PT ASSESSMENT (last 72 hours)      PT Evaluation       10/15/17 1019 10/13/17 1244    Rehab Evaluation    Document Type discharge evaluation/summary  -     Subjective Information agree to therapy  -     Patient Effort, Rehab Treatment excellent  -     Symptoms Noted During/After Treatment none  -     Symptoms Noted Comment pt reports she feels she is at her baseline and does not have any concerns returning home  -     General Information    Onset of Illness/Injury or Date of Surgery Date 10/12/17   L1 VCF  -     General Observations pt resting supine in bed; no acute distress  -     Pertinent History Of Current Problem fall  -     Precautions/Limitations no known precautions/limitations  -     Prior Level of Function independent:;all household mobility;community mobility;gait;transfer;bed mobility;using stairs  -     Equipment Currently Used at Home  rollator   states she does not use her rollator much--home equipment in the pt's room near dresser  -    Living Environment    Lives With  spouse  -JW    Living Arrangements  house  -    Home Accessibility stairs to enter home  -KH bed and bath on same level  -JW    Number of Stairs to Enter Home 13  -KH     Stair Railings at Home outside, present on left side  - none  -    Type of Financial/Environmental Concern  none  -    Transportation Available  car;family or friend will provide  -JW    Clinical Impression    Patient/Family Goals Statement go home  -     Criteria for Skilled Therapeutic Interventions Met no problems identified which require skilled intervention;current level of function same as previous level of function  -     Pain Assessment    Pain Assessment 0-10  -     Pain Score 5  -KH     Pain Location Abdomen  -     Pain  Intervention(s) Repositioned;Ambulation/increased activity  -     Response to Interventions tolerated  -     Cognitive Assessment/Intervention    Current Cognitive/Communication Assessment functional  -     Orientation Status oriented x 4  -     Follows Commands/Answers Questions 100% of the time  -     Personal Safety WNL/WFL  -     Personal Safety Interventions fall prevention program maintained;gait belt;nonskid shoes/slippers when out of bed  -     ROM (Range of Motion)    General ROM Detail BUE and BLE ROM appear WFLs for her age  -     MMT (Manual Muscle Testing)    General MMT Assessment Detail BLE gross strength >/= 3/5. No signs of LE weakness observed during functional mobility   -     Bed Mobility, Assessment/Treatment    Bed Mob, Supine to Sit, [place] independent  -     Bed Mob, Sit to Supine, [place] not tested   up in chair  -     Transfer Assessment/Treatment    Transfers, Sit-Stand [place] independent  -     Transfers, Stand-Sit [place] independent  -     Gait Assessment/Treatment    Gait, [place] Level supervision required  -     Gait, Assistive Device --   none  -     Gait, Distance (Feet) 450  -     Gait, Comment pt slightly deviates from path but does not lose balance or stumble, pt states she feels she is at her baseline.   -     Stairs Assessment/Treatment    Number of Stairs 12  -     Stairs, Handrail Location left side (ascending)  -     Stairs, [place] Level supervision required  -     Stairs, Technique Used step over step (ascending);step to step (descending)   leads RLE  -     Stairs, Comment pt reports she descends stairs 2 feet per step at home because she feels more comfortable and safe  -     Motor Skills/Interventions    Additional Documentation Balance Skills Training (Group)  -     Balance Skills Training    Sitting-Level of Assistance Independent  -     Standing-Level of Assistance Independent  -      Gait Balance-Level of Assistance Close supervision  -     Gait Balance Activities scanning environment R/L   tinetti: 25/28 = low fall risk  -     Positioning and Restraints    Pre-Treatment Position in bed  -     Post Treatment Position chair  -     In Chair notified nsg;sitting;call light within reach;encouraged to call for assist  -       10/12/17 2333 10/12/17 2329    General Information    Equipment Currently Used at Home  walker, rolling  -    Living Environment    Lives With spouse  -     Living Arrangements house  -SG     Home Accessibility bed and bath on same level   no grab bars in the bathroom  -     Type of Financial/Environmental Concern none  -     Transportation Available car;family or friend will provide  -       User Key  (r) = Recorded By, (t) = Taken By, (c) = Cosigned By    Initials Name Provider Type     Irma Aranda, RN Registered Nurse    JEANE Mustafa, PT Physical Therapist    LE Jackson RN Case Manager          Physical Therapy Education     Title: PT OT SLP Therapies (Resolved)     Topic: Physical Therapy (Resolved)     Point: Mobility training (Resolved)    Learning Progress Summary    Learner Readiness Method Response Comment Documented by Status   Patient Acceptance [facility] 10/15/17 1038 Done               Point: Home exercise program (Resolved)    Learning Progress Summary    Learner Readiness Method Response Comment Documented by Status   Patient Acceptance E [facility] 10/15/17 1038 Done                      User Key     Initials Effective Dates Name Provider Type Discipline     12/01/15 -  Marcia Mustafa, PT Physical Therapist PT                PT Recommendation and Plan  Anticipated Discharge Disposition: home  PT Frequency: evaluation only  Plan of Care Review  Outcome Summary/Follow up Plan: Pt is an 85 y/o female admitted to hospital for pneumonia. Pt has a history of a recent fall. Pt presents at baseline function demonstrating  "indpendence with majority of mobility and requiring supervision/SBA for gait and stairs. Pt scored a 25/28 on the Tinetti indicating a low fall risk. Pt was educated on monitoring for dizziness upon standing and before beginning to work and to infform her physician if she notices an increase in dizziness with turning her head or  other movements. Pt presents at baseline and does not require skilled PT at this time.               Outcome Measures       10/15/17 1045          Tinetti Assessment    Sitting Balance 1  -KH      Arises 1  -KH      Attempts to Rise 2  -KH      Immediate Standing Balance (first 5 sec) 2  -KH      Standing Balance 2  -KH      Sternal Nudge (feet close together) 2  -KH      Eyes Closed (feet close together) 1  -KH      Turning 360 Degrees- Steps 1  -KH      Turning 360 Degrees- Steadiness 1  -KH      Sitting Down 2  -KH      Tinetti Balance Score 15  -KH      Gait Initiation (immediate after told \"go\") 1  -KH      Step Length- Right Swing 1  -KH      Step Length- Left Swing 1  -KH      Foot Clearance- Right Foot 1  -KH      Foot Clearance- Left Foot 1  -KH      Step Symmetry 1  -KH      Step Continuity 1  -KH      Path (excursion) 1  -KH      Trunk 2  -KH      Base of Support 0  -KH      Gait Score 10  -KH      Tinetti Total Score 25  -KH      Functional Assessment    Outcome Measure Options Tinetti  -KH        User Key  (r) = Recorded By, (t) = Taken By, (c) = Cosigned By    Initials Name Provider Type    JEANE Mustafa PT Physical Therapist           Time Calculation:         PT Charges       10/15/17 1047          Time Calculation    Start Time 0911  -KH      Stop Time 0929  -KH      Time Calculation (min) 18 min  -KH      PT Received On 10/15/17  -        User Key  (r) = Recorded By, (t) = Taken By, (c) = Cosigned By    Initials Name Provider Type    JEANE Mustafa PT Physical Therapist          Therapy Charges for Today     Code Description Service Date Service Provider " Modifiers Qty    29942690159 HC PT MOBILITY CURRENT 10/15/2017 Marcia Mustafa, PT GP, CI 1    21477479066 HC PT MOBILITY PROJECTED 10/15/2017 Marcia Mustafa, PT GP, CI 1    02879218714 HC PT MOBILITY DISCHARGE 10/15/2017 Marcia Mustafa, PT GP, CI 1    17675768697 HC PT EVAL LOW COMPLEXITY 1 10/15/2017 Marcia Mustafa, PT GP 1    98260900739 HC PT THER PROC EA 15 MIN 10/15/2017 Marcia Mustafa PT GP 1          PT G-Codes  PT Professional Judgement Used?: Yes  Outcome Measure Options: Tinetti  Score: 25/28  Functional Limitation: Mobility: Walking and moving around  Mobility: Walking and Moving Around Current Status (): At least 1 percent but less than 20 percent impaired, limited or restricted  Mobility: Walking and Moving Around Goal Status (): At least 1 percent but less than 20 percent impaired, limited or restricted  Mobility: Walking and Moving Around Discharge Status (): At least 1 percent but less than 20 percent impaired, limited or restricted    PT Discharge Summary  Anticipated Discharge Disposition: home  Reason for Discharge: At baseline function    Marcia Mustafa PT  10/15/2017          (3) no apparent problem

## 2019-04-09 NOTE — ED PROVIDER NOTE - CONSTITUTIONAL, MLM
normal... Tearful and anxious appearing, but well nourished, awake, alert, oriented to person, place, time/situation.

## 2019-04-09 NOTE — ED ADULT NURSE NOTE - NSIMPLEMENTINTERV_GEN_ALL_ED
Implemented All Universal Safety Interventions:  [place]attiest to call system. Call bell, personal items and telephone within reach. Instruct patient to call for assistance. Room bathroom lighting operational. Non-slip footwear when patient is off stretcher. Physically safe environment: no spills, clutter or unnecessary equipment. Stretcher in lowest position, wheels locked, appropriate side rails in place.

## 2019-04-09 NOTE — ED PROVIDER NOTE - CLINICAL SUMMARY MEDICAL DECISION MAKING FREE TEXT BOX
PT with stable VS, no acute distress, non toxic appearing, tolerating PO in the ED, resolution of symptoms after conservative medical intervention, PT with likely anxiety induced symptoms pt educated about good follow up to to mental health care professional, educated about when to return to the ED if needed. PT verbalizes that he understands all instructions and results. Pt educated about the risks vs benefits of imaging at this time and agrees that not warranted for their symptoms, and PE.  PT demonstrates decision making ability no s/s of thomas, psychosis, A&O x3 in no acute distress no HI/SI.

## 2019-04-09 NOTE — ED ADULT NURSE NOTE - OBJECTIVE STATEMENT
a couple of days with dyspnea, no coughing. Smokes 1 pack per week. Denies anxiety, EKG done. No chest pain now.

## 2019-04-09 NOTE — ED ADULT TRIAGE NOTE - CHIEF COMPLAINT QUOTE
pt biba c/o sudden onset SOB since this afternoon at 1500.  pt also states that she felt like she was going to pass out while she was driving.

## 2019-10-16 NOTE — ED PROVIDER NOTE - NS ED MD DISPO ADMIT SSH UNIT
Subjective:     Debo Ceballos is a 89 y.o. female with long history of difficult to control hypertension and hypercholesterolemia. She is mildly obese. She has severe carotid artery disease and underwent left carotid endarterectomy in 2011. She cannot tolerate statins as that causes severe muscle pains. She could not tolerate carvedilol either as she felt that caused her to get an unsteady gait and upset stomach. She could not tolerate amlodipine as that caused leg edema. She had an MRI for questionable slurred speech in 4/2013 which revealed generalized atrophy. Beginning in 7/2017 after her  passed she experienced occasional mild chest pressure. On 8/9/2017 she had a Stress Echo and was able to do 3:00 minutes without any CP and the ECG was negative as was the Echo.  The chest discomfort resolved. On 11/27/2017 after visiting her podiatrist she walked out to her car. She fairly suddenly felt a mild chest pressure with a discomfort in the back of her neck. She denied any dyspnea. She felt weak and thought she may pass out. She called for help and was brought to the ER. She had no further chest discomfort. The ECG did not reveal any clear acute changes. Troponin however was elevated to 2.0. She was admitted.  On 11/28/2017 she underwent coronary angiography that revealed a severe LAD/D1 bifurcation lesion. The LAD was stented and the D1 dilated using a two wire technique. She felt well following the intervention. The troponin vanna to >50 following the intervention which was not expected and felt to possibly be related to reperfusion issues. There were no wall motion abnormality seen. She had some ecchymosis in the right groin. Due to a high blood pressure spironolactone was addded to her antihypertensive regimen. She did not want to try a statin. The importance of DAPT was stressed. On 5/16/2018 she had an Echo that revealed normal left ventricular size and systolic function with mild LVH. There was  mild diastolic dysfunction and the LA was moderately dilated. In addition there was mild aortic valve sclerosis with a peak velocity of 1.9 m/s. No exertional chest discomfort but mild exertional dyspnea. No palpitations or weak spells. No bleeding. In 2019 she began noticing occasional bruising of the left thumb. Feeling well overall.       Coronary Artery Disease   Presents for follow-up visit. Pertinent negatives include no chest pain, chest pressure, chest tightness, dizziness, leg swelling, muscle weakness, palpitations, shortness of breath or weight gain. Risk factors include hyperlipidemia. Risk factors do not include obesity. Her past medical history is significant for CHF. The symptoms have been stable.   Chest Pain    The current episode started more than 1 year ago. The problem has been resolved. Pertinent negatives include no abdominal pain, back pain, claudication, cough, dizziness, fever, headaches, hemoptysis, irregular heartbeat, leg pain, malaise/fatigue, nausea, near-syncope, numbness, orthopnea, palpitations, PND, shortness of breath, syncope, vomiting or weakness.   Her past medical history is significant for CAD, CHF and hyperlipidemia.   Pertinent negatives for past medical history include no diabetes and no muscle weakness.   Congestive Heart Failure   Presents for follow-up visit. Pertinent negatives include no abdominal pain, chest pain, chest pressure, claudication, edema, fatigue, muscle weakness, near-syncope, nocturia, orthopnea, palpitations, paroxysmal nocturnal dyspnea, shortness of breath or unexpected weight change. The symptoms have been stable. Her past medical history is significant for CAD.   Hypertension   The problem has been resolved since onset. The problem is controlled (usually 120-140/70-80 mmHg at home). Pertinent negatives include no anxiety, blurred vision, chest pain, headaches, malaise/fatigue, neck pain, orthopnea, palpitations, peripheral edema, PND, shortness of  breath or sweats. There is no history of chronic renal disease.   Hyperlipidemia   Recent lipid tests were reviewed and are high. She has no history of chronic renal disease, diabetes, hypothyroidism, liver disease, obesity or nephrotic syndrome. Pertinent negatives include no chest pain, focal sensory loss, focal weakness, leg pain, myalgias or shortness of breath.       Review of Systems   Constitution: Negative for chills, fatigue, fever, malaise/fatigue, unexpected weight change and weight gain.   HENT: Negative for nosebleeds.    Eyes: Negative for blurred vision, double vision, vision loss in left eye and vision loss in right eye.   Cardiovascular: Negative for chest pain, claudication, dyspnea on exertion, irregular heartbeat, leg swelling, near-syncope, orthopnea, palpitations, paroxysmal nocturnal dyspnea and syncope.   Respiratory: Negative for chest tightness, cough, hemoptysis, shortness of breath and wheezing.    Endocrine: Negative for cold intolerance and heat intolerance.   Hematologic/Lymphatic: Negative for bleeding problem. Does not bruise/bleed easily.   Skin: Negative for color change and rash.   Musculoskeletal: Negative for back pain, falls, muscle weakness, myalgias and neck pain.   Gastrointestinal: Negative for abdominal pain, heartburn, hematemesis, hematochezia, hemorrhoids, jaundice, melena, nausea and vomiting.   Genitourinary: Negative for dysuria, hematuria and nocturia.   Neurological: Negative for dizziness, focal weakness, headaches, light-headedness, loss of balance, numbness, vertigo and weakness.   Psychiatric/Behavioral: Negative for altered mental status, depression and memory loss. The patient is not nervous/anxious.    Allergic/Immunologic: Negative for hives and persistent infections.       Current Outpatient Medications on File Prior to Visit   Medication Sig Dispense Refill    aspirin (ECOTRIN) 81 MG EC tablet Take 1 tablet (81 mg total) by mouth once daily. 90 tablet 3  "   clorazepate (TRANXENE) 3.75 MG Tab Take 7.5 mg by mouth 3 (three) times daily.      losartan (COZAAR) 100 MG tablet Take 1 tablet (100 mg total) by mouth once daily. 90 tablet 3    metoprolol succinate (TOPROL-XL) 50 MG 24 hr tablet Take 1 tablet (50 mg total) by mouth once daily. 90 tablet 3    oxybutynin (DITROPAN-XL) 5 MG TR24 Take 5 mg by mouth once daily.      spironolactone (ALDACTONE) 25 MG tablet Take 1 tablet (25 mg total) by mouth once daily. 90 tablet 3    torsemide (DEMADEX) 10 MG Tab Take 1 tablet (10 mg total) by mouth once daily. 90 tablet 3     No current facility-administered medications on file prior to visit.        BP (!) 140/74   Pulse (!) 50   Ht 5' 4" (1.626 m)   Wt 80.3 kg (177 lb)   BMI 30.38 kg/m²       Objective:     Physical Exam   Constitutional: She is oriented to person, place, and time. She appears well-developed and well-nourished.  Non-toxic appearance. No distress.   HENT:   Head: Normocephalic and atraumatic.   Nose: Nose normal.   Eyes: Right eye exhibits no discharge. Left eye exhibits no discharge. Right conjunctiva is not injected. Left conjunctiva is not injected. Right pupil is round. Left pupil is round. Pupils are equal.   Neck: Neck supple. No JVD present. Carotid bruit is not present. No thyromegaly present.   Cardiovascular: Normal rate, regular rhythm, S1 normal and S2 normal.  No extrasystoles are present. PMI is not displaced. Exam reveals no gallop.   Pulses:       Radial pulses are 2+ on the right side, and 2+ on the left side.        Femoral pulses are 2+ on the right side, and 2+ on the left side.       Dorsalis pedis pulses are 2+ on the right side, and 2+ on the left side.        Posterior tibial pulses are 2+ on the right side, and 2+ on the left side.   Pulmonary/Chest: Effort normal and breath sounds normal.   Abdominal: Soft. Normal appearance. There is no hepatosplenomegaly. There is no tenderness.   Musculoskeletal:        Right ankle: She " exhibits no swelling, no ecchymosis and no deformity.        Left ankle: She exhibits no swelling, no ecchymosis and no deformity.   Lymphadenopathy:        Head (right side): No submandibular adenopathy present.        Head (left side): No submandibular adenopathy present.     She has no cervical adenopathy.   Neurological: She is alert and oriented to person, place, and time. She is not disoriented. No cranial nerve deficit.   Skin: Skin is warm, dry and intact. Ecchymosis (left thumb) noted. No rash noted. She is not diaphoretic.   Psychiatric: She has a normal mood and affect. Her speech is normal and behavior is normal. Judgment and thought content normal. Cognition and memory are normal.         Assessment:     1. Coronary artery disease involving native coronary artery of native heart without angina pectoris    2. History of myocardial infarction    3. History of coronary artery stent placement    4. History of chest pain    5. Heart failure, diastolic, chronic    6. Aortic valve disease    7. Bilateral carotid artery stenosis    8. Essential hypertension    9. Hypercholesterolemia    10. Mild obesity        Plan:   l  1. Coronary Artery Disease              11/27/2017: NSTEMI. Troponin >50.              11/28/[place] [facility] [place]: LAD: 80% involving D1. D1 osteal 80%. LV: Normal systolic function. LAD: LM 3.0 x 18 mm. D1: PTCA 2.5 mm.              On aspirin 81 mg Q24.   Will stop aspirin with frequent bruising of fingers. She showed me images. Doubt benefit with aspirin >risk.              Doing well.     2. Heart Failure, Diastolic, Chronic              6/14/2013: Echo: Normal LV size and function. Mild aortic valve sclerosis with mild AR.              7/7/2017: Echo: Normal left ventricular size and systolic function. Mild LVH. Moderate diastolic dysfunction. Moderately dilated LA. Mild AS - 1.7 m/s. SPAP 48 mmHg.              5/16/2018: Echo: Normal left ventricular size and systolic function. Mild LVH.  Mild diastolic dysfunction. Moderately dilated LA. Mild aortic valve sclerosis - 1.9 m/s.   Leg edema appeared at least partially related to amlodipine.              On torsemide 10 mg Q24 and spironolactone 25 mg Q24.              Well compensated.                3. Aortic Valve Disease              6/14/2013: Echo: Mild aortic valve sclerosis with mild AR.              7/7/2017: Echo: Mild AS - 1.7 m/s.              5/16/2018: Echo: Mild aortic valve sclerosis - 1.9 m/s.              Stable.     4. History of Chest Pain              2017: Began experience chest discomfort.              8/9/2017: Stress Echo: 3:00 min. No CP. ECG negative. Echo negative.              Resolved.     5. Carotid Artery Disease              2/3/2011: Left CEA for 80% stenosis with small ulcer. Had mild WILLIAM disease.              3/20/2012: Carotid Duplex: Mild disease.              5/6/2014: Carotid Duplex: WILLIAM: moderate plaquing -1.2 m/s - 50-60%. LICA: moderate plaquing - tortuous - 1.6 m/s - 50-60%.              5/5/2015: Carotid Duplex: Moderate plaquing.               7/7/2017: Carotid Duplex: WILLIAM: Moderate plaquing - 1.3 m/s - 50-60%. LICA: Moderate plaquing - 1.8 m/s - 60-70%.              8/15/2018: Carotid Duplex: WILLIAM: Moderate plaquing - 1.5 m/s - 50-60%. LICA: Moderate plaquing - 1.3 m/s - 50-60%.   8/7/2019: Carotid Duplex: WILLIAM: Moderate plaquing - 1.4 m/s - 50-60%. LICA: Moderate plaquing - 1.5 m/s - 50-60%.              Been on aspirin 81 mg Q24 - as above.               8/2020: Plan next Carotid Duplex. Then yearly.     6. Hypertension              1955: Diagnosed.              Severe hypertension.              Could not tolerate carvedilol or amlodipine.              On metoprolol 50 mg Q24, losartan 100 mg Q24, spironolactone 25 mg Q24 and torsemide 10 mg Q24.              Keeping log at home.               Fairly well controlled overall.                 7. Hypercholesterolemia              2013: Could not tolerate  statin due to severe muscle pains.              Absolutely against trying any statin.     8. Mild Obesity              6/27/2017: Weight 82 kg. BMI 31.              Encouraged to lose weight.     9. Primary Care              Dr. Donald Thibodeaux.     F/u 6 months.     Ken Wilson M.D.         MEDICINE HOSPITALIST

## 2020-09-16 NOTE — ED ADULT NURSE NOTE - NSFALLRSKINDICATORS_ED_ALL_ED
Additional Notes: Patient consent was obtained to proceed with the visit and recommended plan of care after discussion of all risks and benefits, including the risks of COVID-19 exposure.
Detail Level: Simple
no

## 2023-03-19 ENCOUNTER — EMERGENCY (EMERGENCY)
Facility: HOSPITAL | Age: 38
LOS: 1 days | Discharge: HOME | End: 2023-03-19
Attending: EMERGENCY MEDICINE
Payer: MEDICAID

## 2023-03-19 VITALS
SYSTOLIC BLOOD PRESSURE: 181 MMHG | WEIGHT: 190.92 LBS | HEART RATE: 98 BPM | RESPIRATION RATE: 18 BRPM | TEMPERATURE: 99 F | HEIGHT: 69 IN | OXYGEN SATURATION: 99 % | DIASTOLIC BLOOD PRESSURE: 114 MMHG

## 2023-03-19 LAB
ALBUMIN SERPL ELPH-MCNC: 4.2 G/DL — SIGNIFICANT CHANGE UP (ref 3.3–5.2)
ALP SERPL-CCNC: 73 U/L — SIGNIFICANT CHANGE UP (ref 40–120)
ALT FLD-CCNC: 39 U/L — HIGH
AMPHET UR-MCNC: NEGATIVE — SIGNIFICANT CHANGE UP
ANION GAP SERPL CALC-SCNC: 20 MMOL/L — HIGH (ref 5–17)
APPEARANCE UR: ABNORMAL
AST SERPL-CCNC: 77 U/L — HIGH
BACTERIA # UR AUTO: ABNORMAL
BARBITURATES UR SCN-MCNC: NEGATIVE — SIGNIFICANT CHANGE UP
BASOPHILS # BLD AUTO: 0.05 K/UL — SIGNIFICANT CHANGE UP (ref 0–0.2)
BASOPHILS NFR BLD AUTO: 1 % — SIGNIFICANT CHANGE UP (ref 0–2)
BENZODIAZ UR-MCNC: NEGATIVE — SIGNIFICANT CHANGE UP
BILIRUB SERPL-MCNC: 0.5 MG/DL — SIGNIFICANT CHANGE UP (ref 0.4–2)
BILIRUB UR-MCNC: ABNORMAL
BUN SERPL-MCNC: 5.4 MG/DL — LOW (ref 8–20)
CALCIUM SERPL-MCNC: 8.4 MG/DL — SIGNIFICANT CHANGE UP (ref 8.4–10.5)
CHLORIDE SERPL-SCNC: 92 MMOL/L — LOW (ref 96–108)
CO2 SERPL-SCNC: 22 MMOL/L — SIGNIFICANT CHANGE UP (ref 22–29)
COCAINE METAB.OTHER UR-MCNC: NEGATIVE — SIGNIFICANT CHANGE UP
COLOR SPEC: ABNORMAL
COMMENT - URINE: SIGNIFICANT CHANGE UP
CREAT SERPL-MCNC: 0.63 MG/DL — SIGNIFICANT CHANGE UP (ref 0.5–1.3)
DIFF PNL FLD: ABNORMAL
EGFR: 118 ML/MIN/1.73M2 — SIGNIFICANT CHANGE UP
EOSINOPHIL # BLD AUTO: 0.01 K/UL — SIGNIFICANT CHANGE UP (ref 0–0.5)
EOSINOPHIL NFR BLD AUTO: 0.2 % — SIGNIFICANT CHANGE UP (ref 0–6)
EPI CELLS # UR: ABNORMAL
ETHANOL SERPL-MCNC: <10 MG/DL — SIGNIFICANT CHANGE UP (ref 0–9)
GLUCOSE SERPL-MCNC: 118 MG/DL — HIGH (ref 70–99)
GLUCOSE UR QL: NEGATIVE MG/DL — SIGNIFICANT CHANGE UP
HCG UR QL: NEGATIVE — SIGNIFICANT CHANGE UP
HCT VFR BLD CALC: 35.2 % — SIGNIFICANT CHANGE UP (ref 34.5–45)
HGB BLD-MCNC: 11.9 G/DL — SIGNIFICANT CHANGE UP (ref 11.5–15.5)
IMM GRANULOCYTES NFR BLD AUTO: 1.1 % — HIGH (ref 0–0.9)
KETONES UR-MCNC: ABNORMAL
LEUKOCYTE ESTERASE UR-ACNC: ABNORMAL
LYMPHOCYTES # BLD AUTO: 1.14 K/UL — SIGNIFICANT CHANGE UP (ref 1–3.3)
LYMPHOCYTES # BLD AUTO: 21.7 % — SIGNIFICANT CHANGE UP (ref 13–44)
MCHC RBC-ENTMCNC: 28.3 PG — SIGNIFICANT CHANGE UP (ref 27–34)
MCHC RBC-ENTMCNC: 33.8 GM/DL — SIGNIFICANT CHANGE UP (ref 32–36)
MCV RBC AUTO: 83.8 FL — SIGNIFICANT CHANGE UP (ref 80–100)
METHADONE UR-MCNC: NEGATIVE — SIGNIFICANT CHANGE UP
MONOCYTES # BLD AUTO: 0.41 K/UL — SIGNIFICANT CHANGE UP (ref 0–0.9)
MONOCYTES NFR BLD AUTO: 7.8 % — SIGNIFICANT CHANGE UP (ref 2–14)
NEUTROPHILS # BLD AUTO: 3.59 K/UL — SIGNIFICANT CHANGE UP (ref 1.8–7.4)
NEUTROPHILS NFR BLD AUTO: 68.2 % — SIGNIFICANT CHANGE UP (ref 43–77)
NITRITE UR-MCNC: NEGATIVE — SIGNIFICANT CHANGE UP
OPIATES UR-MCNC: NEGATIVE — SIGNIFICANT CHANGE UP
PCP SPEC-MCNC: SIGNIFICANT CHANGE UP
PCP UR-MCNC: NEGATIVE — SIGNIFICANT CHANGE UP
PH UR: 7 — SIGNIFICANT CHANGE UP (ref 5–8)
PLATELET # BLD AUTO: 270 K/UL — SIGNIFICANT CHANGE UP (ref 150–400)
POTASSIUM SERPL-MCNC: 3.2 MMOL/L — LOW (ref 3.5–5.3)
POTASSIUM SERPL-SCNC: 3.2 MMOL/L — LOW (ref 3.5–5.3)
PROT SERPL-MCNC: 8 G/DL — SIGNIFICANT CHANGE UP (ref 6.6–8.7)
PROT UR-MCNC: 30 MG/DL
RBC # BLD: 4.2 M/UL — SIGNIFICANT CHANGE UP (ref 3.8–5.2)
RBC # FLD: 15.9 % — HIGH (ref 10.3–14.5)
RBC CASTS # UR COMP ASSIST: SIGNIFICANT CHANGE UP /HPF (ref 0–4)
SARS-COV-2 RNA SPEC QL NAA+PROBE: SIGNIFICANT CHANGE UP
SODIUM SERPL-SCNC: 134 MMOL/L — LOW (ref 135–145)
SP GR SPEC: 1.01 — SIGNIFICANT CHANGE UP (ref 1.01–1.02)
THC UR QL: NEGATIVE — SIGNIFICANT CHANGE UP
UROBILINOGEN FLD QL: 1 MG/DL
WBC # BLD: 5.26 K/UL — SIGNIFICANT CHANGE UP (ref 3.8–10.5)
WBC # FLD AUTO: 5.26 K/UL — SIGNIFICANT CHANGE UP (ref 3.8–10.5)
WBC UR QL: SIGNIFICANT CHANGE UP /HPF (ref 0–5)

## 2023-03-19 PROCEDURE — 71045 X-RAY EXAM CHEST 1 VIEW: CPT | Mod: 26

## 2023-03-19 PROCEDURE — 93010 ELECTROCARDIOGRAM REPORT: CPT

## 2023-03-19 PROCEDURE — 99223 1ST HOSP IP/OBS HIGH 75: CPT

## 2023-03-19 RX ORDER — ONDANSETRON 8 MG/1
4 TABLET, FILM COATED ORAL ONCE
Refills: 0 | Status: COMPLETED | OUTPATIENT
Start: 2023-03-19 | End: 2023-03-19

## 2023-03-19 RX ORDER — POTASSIUM CHLORIDE 20 MEQ
40 PACKET (EA) ORAL ONCE
Refills: 0 | Status: COMPLETED | OUTPATIENT
Start: 2023-03-19 | End: 2023-03-19

## 2023-03-19 RX ORDER — SODIUM CHLORIDE 9 MG/ML
1000 INJECTION INTRAMUSCULAR; INTRAVENOUS; SUBCUTANEOUS ONCE
Refills: 0 | Status: COMPLETED | OUTPATIENT
Start: 2023-03-19 | End: 2023-03-19

## 2023-03-19 RX ADMIN — SODIUM CHLORIDE 1000 MILLILITER(S): 9 INJECTION INTRAMUSCULAR; INTRAVENOUS; SUBCUTANEOUS at 11:23

## 2023-03-19 RX ADMIN — ONDANSETRON 4 MILLIGRAM(S): 8 TABLET, FILM COATED ORAL at 12:33

## 2023-03-19 RX ADMIN — Medication 75 MILLIGRAM(S): at 18:24

## 2023-03-19 RX ADMIN — Medication 75 MILLIGRAM(S): at 11:22

## 2023-03-19 RX ADMIN — Medication 75 MILLIGRAM(S): at 22:40

## 2023-03-19 RX ADMIN — Medication 40 MILLIEQUIVALENT(S): at 17:21

## 2023-03-19 RX ADMIN — Medication 1 MILLIGRAM(S): at 12:33

## 2023-03-19 RX ADMIN — SODIUM CHLORIDE 1000 MILLILITER(S): 9 INJECTION INTRAMUSCULAR; INTRAVENOUS; SUBCUTANEOUS at 20:12

## 2023-03-19 NOTE — ED ADULT TRIAGE NOTE - CHIEF COMPLAINT QUOTE
pt a+ox3, reports worsening tremors and SOB x 2 days. denies fever or chills. reports hx of ETOH, last drank 3 days ago.

## 2023-03-19 NOTE — ED ADULT NURSE NOTE - NSICDXPASTMEDICALHX_GEN_ALL_CORE_FT
PAST MEDICAL HISTORY:  ETOH abuse     No pertinent past medical history     Withdrawal symptoms, alcohol

## 2023-03-19 NOTE — ED PROVIDER NOTE - OBJECTIVE STATEMENT
This is a 37-year-old female with a history of binge drinking.  She states that she stopped drinking 2 days ago.  Since she stopped she has had tremors anxious she states she has been vomiting and generally is not feeling well.  Patient states that she drinks 2 pints a day and has been doing so intermittently for a few years.  Patient wants to stop drinking.  No fever no upper respiratory symptoms no urinary symptoms.  Initial CIWA score 6.

## 2023-03-19 NOTE — SBIRT NOTE ADULT - NSSBIRTALCACTIVEREFTXDET_GEN_A_CORE
Pt reports drinking 2 pints of liquor nightly after work, not having a drink in about 3 days now. Pt reports being in detox in the past but could not recall where. .

## 2023-03-19 NOTE — ED PROVIDER NOTE - CLINICAL SUMMARY MEDICAL DECISION MAKING FREE TEXT BOX
his is a 37-year-old female with a history of binge drinking.  She states that she stopped drinking 2 days ago.  Since she stopped she has had tremors anxious she states she has been vomiting and generally is not feeling well.  Patient states that she drinks 2 pints a day and has been doing so intermittently for a few years.  Patient wants to stop drinking.  No fever no upper respiratory symptoms no urinary symptoms.  Initial CIWA score 6..  Patient responded well to Librium and Ativan.  Blood pressure normalized.  Patient wants to go to detox.  There are no beds at [facility].  And no beds at [facility] we can call back in the morning they should have some openings.  We will put patient on virtual labs.

## 2023-03-19 NOTE — ED ADULT NURSE REASSESSMENT NOTE - NS ED NURSE REASSESS COMMENT FT1
Pt resting comfortably on stretcher.  No complaints of pain.  Respirations even and unlabored.  Awaiting SW consult.  NSR on CM.  PIV wnl; flushing without difficulty.  In NAD, will continue to monitor.

## 2023-03-19 NOTE — ED ADULT NURSE NOTE - OBJECTIVE STATEMENT
Pt c/o tremors, nausea, abdominal pain with reported blood in stool. Pt endorsing drinking 2 pints of vodka everyday until 3 days ago when she stopped.  Pt endorsing originally having visual hallucinations and episodes of vomiting.  Pt arrives hypertensive with CIWA 6.  Pt endorsing palpitations and sob; NSR on CM; O2 wnl on room air.  Pt asking for help with drinking during assessment; pt calm and cooperative

## 2023-03-20 VITALS
HEART RATE: 83 BPM | SYSTOLIC BLOOD PRESSURE: 132 MMHG | DIASTOLIC BLOOD PRESSURE: 90 MMHG | OXYGEN SATURATION: 99 % | RESPIRATION RATE: 18 BRPM

## 2023-03-20 LAB
FLUAV AG NPH QL: SIGNIFICANT CHANGE UP
FLUBV AG NPH QL: SIGNIFICANT CHANGE UP
RSV RNA NPH QL NAA+NON-PROBE: SIGNIFICANT CHANGE UP
SARS-COV-2 RNA SPEC QL NAA+PROBE: SIGNIFICANT CHANGE UP

## 2023-03-20 PROCEDURE — U0003: CPT

## 2023-03-20 PROCEDURE — 87637 SARSCOV2&INF A&B&RSV AMP PRB: CPT

## 2023-03-20 PROCEDURE — 96375 TX/PRO/DX INJ NEW DRUG ADDON: CPT

## 2023-03-20 PROCEDURE — U0005: CPT

## 2023-03-20 PROCEDURE — 80307 DRUG TEST PRSMV CHEM ANLYZR: CPT

## 2023-03-20 PROCEDURE — 36415 COLL VENOUS BLD VENIPUNCTURE: CPT

## 2023-03-20 PROCEDURE — 99238 HOSP IP/OBS DSCHRG MGMT 30/<: CPT

## 2023-03-20 PROCEDURE — 81025 URINE PREGNANCY TEST: CPT

## 2023-03-20 PROCEDURE — G0378: CPT

## 2023-03-20 PROCEDURE — 80053 COMPREHEN METABOLIC PANEL: CPT

## 2023-03-20 PROCEDURE — 81001 URINALYSIS AUTO W/SCOPE: CPT

## 2023-03-20 PROCEDURE — 85025 COMPLETE CBC W/AUTO DIFF WBC: CPT

## 2023-03-20 PROCEDURE — 71045 X-RAY EXAM CHEST 1 VIEW: CPT

## 2023-03-20 PROCEDURE — 99285 EMERGENCY DEPT VISIT HI MDM: CPT | Mod: 25

## 2023-03-20 PROCEDURE — G0396: CPT

## 2023-03-20 PROCEDURE — 96361 HYDRATE IV INFUSION ADD-ON: CPT

## 2023-03-20 PROCEDURE — 96374 THER/PROPH/DIAG INJ IV PUSH: CPT

## 2023-03-20 PROCEDURE — 93005 ELECTROCARDIOGRAM TRACING: CPT

## 2023-03-20 RX ADMIN — Medication 75 MILLIGRAM(S): at 02:01

## 2023-03-20 RX ADMIN — Medication 50 MILLIGRAM(S): at 10:02

## 2023-03-20 RX ADMIN — Medication 75 MILLIGRAM(S): at 06:08

## 2023-03-20 NOTE — DISCHARGE NOTE NURSING/CASE MANAGEMENT/SOCIAL WORK - NSDCPEFALRISK_GEN_ALL_CORE
For information on Fall & Injury Prevention, visit: [email or web address] OR  [email or web address] OR  https://www.cdc.gov/steadi/patient.html

## 2023-03-20 NOTE — ED CDU PROVIDER DISPOSITION NOTE - PATIENT PORTAL LINK FT
You can access the FollowMyHealth Patient Portal offered by [facility] by registering at the following website: [email or web address]. By joining [employer]’s FollowMyHealth portal, you will also be able to view your health information using other applications (apps) compatible with our system.

## 2023-03-20 NOTE — DISCHARGE NOTE NURSING/CASE MANAGEMENT/SOCIAL WORK - PATIENT PORTAL LINK FT
You can access the FollowMyHealth Patient Portal offered by [facility] by registering at the following website: [email or web address]. By joining [employer]’s FollowMyHealth portal, you will also be able to view your health information using other applications (apps) compatible with our system.

## 2023-03-20 NOTE — ED CDU PROVIDER DISPOSITION NOTE - CLINICAL COURSE
38 y/o F with PMH EtOH abuse presents for alcohol withdrawal after stopping drinking 2 days ago, required multiple doses of medication for withdrawal, medically cleared, accepted to [facility].

## 2023-03-20 NOTE — ED CDU PROVIDER SUBSEQUENT DAY NOTE - PROGRESS NOTE DETAILS
I discussed with Dr. Covington at [facility] who requests that flu/Covid swab be sent and patient is conditionally accepted pending negative flu test, in which case no return phone call is necessary, however if patient is flu positive, he will need to discuss further.

## 2023-03-20 NOTE — CHART NOTE - NSCHARTNOTEFT_GEN_A_CORE
SOCIAL WORK NOTE:  PLACED CALL TO DR RIBEIRO THIS MORNING. HE CONFIRMED THERE ARE BEDS TODAY AND WOULD NEED MD TO CALL IN TO GIVE RPEORT ON CASE.  MADE MD KACY ZAMARRIPA AWARE AND SHE REPORTED SHE WOULD COMPLY WITH SAME. SW WILL FOLLOW FOR TRANSFER TO [facility].
SW Note: Worker alerted by MD that pt is seeking detox placement for ETOH abuse. SBIRT screening flagged. Worker met with pt at bedside. Pt in agreement with detox placement. Pt amenable to detox placement but had many questions regarding logistics of when and where. Worker explained list of facilities that are worked with, naming [facility] as the first point of contact. Pt agreeable. Worker emailed [facility] with information regarding referral. If not beds available for today, pt also open to placement at [facility] for detox. SBIRT completed. SW continues to follow.
SOCIAL WORK NOTE:  DR VAZQUEZ CALLED AND HAS MD TO MD WITH DR RIBEIRO WHOM ACCEPTED THE PATIENT PENDING RETURN OF HER FLU SWAB. PATIENT'S FLU AND COVID ARE NEGATIVE.  ALL CLINICALS AND DC PAPERS FAXED TO [facility] AT FAX # [phone]. AMBULANCE ARRANGED FOR 2;30 PM AND [facility] AND AMBULANCE BILLING FORM PROVIDED TO THE PATIENT. RN AWARE TO COMPLETE RN TO RN.

## 2024-02-22 NOTE — ED ADULT TRIAGE NOTE - BMI (KG/M2)
28.2 atorvastatin 40 mg PO qhs, Synthroid 50mcg po qd, melatonin 3mg po qhs prn; metformin 850mg PO qd; metoprolol ER 50mg PO qd, rivaroxaban 20 mg oral tablet: 1 tab(s) orally once a day

## 2025-03-26 NOTE — ED PROVIDER NOTE - ENMT NEGATIVE STATEMENT, MLM
[de-identified] : Presents for BP check, labs, and general follow up.  States feeling generally well; going to the gym; trying to follow a healthy diet.  Ears: no ear pain and no hearing problems.Nose: no nasal congestion and no nasal drainage.Mouth/Throat: no dysphagia, no hoarseness and no throat pain.Neck: no lumps, no pain, no stiffness and no swollen glands.